# Patient Record
Sex: FEMALE | Race: WHITE | URBAN - METROPOLITAN AREA
[De-identification: names, ages, dates, MRNs, and addresses within clinical notes are randomized per-mention and may not be internally consistent; named-entity substitution may affect disease eponyms.]

---

## 2021-12-23 ENCOUNTER — HOSPITAL ENCOUNTER (OUTPATIENT)
Dept: ADMINISTRATIVE | Facility: HOSPITAL | Age: 21
End: 2021-12-23
Attending: OBSTETRICS & GYNECOLOGY | Admitting: OBSTETRICS & GYNECOLOGY

## 2021-12-23 LAB
ABS NEUT (OLG): 5.68 X10(3)/MCL (ref 2.1–9.2)
ALBUMIN SERPL-MCNC: 4.2 GM/DL (ref 3.5–5)
ALBUMIN/GLOB SERPL: 1.4 RATIO (ref 1.1–2)
ALP SERPL-CCNC: 51 UNIT/L (ref 40–150)
ALT SERPL-CCNC: 12 UNIT/L (ref 0–55)
ANION GAP SERPL CALC-SCNC: 19 MMOL/L
APPEARANCE, UA: ABNORMAL
AST SERPL-CCNC: 19 UNIT/L (ref 5–34)
BACTERIA SPEC CULT: ABNORMAL /HPF
BASOPHILS # BLD AUTO: 0 X10(3)/MCL (ref 0–0.2)
BASOPHILS NFR BLD AUTO: 0 %
BILIRUB SERPL-MCNC: 0.4 MG/DL
BILIRUB UR QL STRIP: NEGATIVE
BILIRUBIN DIRECT+TOT PNL SERPL-MCNC: 0.2 MG/DL (ref 0–0.5)
BILIRUBIN DIRECT+TOT PNL SERPL-MCNC: 0.2 MG/DL (ref 0–0.8)
BUN SERPL-MCNC: 8.9 MG/DL (ref 7–18.7)
BUN SERPL-MCNC: 9 MG/DL (ref 8–26)
CALCIUM SERPL-MCNC: 10.3 MG/DL (ref 8.7–10.5)
CHLORIDE SERPL-SCNC: 100 MMOL/L (ref 98–109)
CHLORIDE SERPL-SCNC: 106 MMOL/L (ref 98–107)
CO2 SERPL-SCNC: 23 MMOL/L (ref 22–29)
COLOR UR: YELLOW
CREAT SERPL-MCNC: 0.6 MG/DL (ref 0.6–1.3)
CREAT SERPL-MCNC: 0.73 MG/DL (ref 0.55–1.02)
EOSINOPHIL # BLD AUTO: 0.2 X10(3)/MCL (ref 0–0.9)
EOSINOPHIL NFR BLD AUTO: 2 %
ERYTHROCYTE [DISTWIDTH] IN BLOOD BY AUTOMATED COUNT: 11.8 % (ref 11.5–17)
EST CREAT CLEARANCE SER (OHS): 131.22 ML/MIN
GLOBULIN SER-MCNC: 3.1 GM/DL (ref 2.4–3.5)
GLUCOSE (UA): NEGATIVE
GLUCOSE SERPL-MCNC: 101 MG/DL (ref 70–105)
GLUCOSE SERPL-MCNC: 98 MG/DL (ref 74–100)
HCT VFR BLD AUTO: 42.3 % (ref 37–47)
HCT VFR BLD CALC: 45 % (ref 38–51)
HGB BLD-MCNC: 14.2 GM/DL (ref 12–16)
HGB BLD-MCNC: 15.3 MG/DL (ref 12–17)
HGB UR QL STRIP: NEGATIVE
KETONES UR QL STRIP: NEGATIVE
LEUKOCYTE ESTERASE UR QL STRIP: ABNORMAL
LYMPHOCYTES # BLD AUTO: 3.7 X10(3)/MCL (ref 0.6–4.6)
LYMPHOCYTES NFR BLD AUTO: 35 %
MCH RBC QN AUTO: 32.3 PG (ref 27–31)
MCHC RBC AUTO-ENTMCNC: 33.6 GM/DL (ref 33–36)
MCV RBC AUTO: 96.1 FL (ref 80–94)
MONOCYTES # BLD AUTO: 1 X10(3)/MCL (ref 0.1–1.3)
MONOCYTES NFR BLD AUTO: 10 %
NEUTROPHILS # BLD AUTO: 5.68 X10(3)/MCL (ref 2.1–9.2)
NEUTROPHILS NFR BLD AUTO: 53 %
NITRITE UR QL STRIP: NEGATIVE
PH UR STRIP: 7.5 [PH] (ref 5–9)
PLATELET # BLD AUTO: 342 X10(3)/MCL (ref 130–400)
PMV BLD AUTO: 9.3 FL (ref 9.4–12.4)
POC BETA-HCG (QUAL): NEGATIVE
POC IONIZED CALCIUM: 1.28 MMOL/L (ref 1.12–1.32)
POC TCO2: 26 MMOL/L (ref 24–29)
POTASSIUM BLD-SCNC: 3.8 MMOL/L (ref 3.5–4.9)
POTASSIUM SERPL-SCNC: 4.2 MMOL/L (ref 3.5–5.1)
PROT SERPL-MCNC: 7.3 GM/DL (ref 6.4–8.3)
PROT UR QL STRIP: NEGATIVE
RBC # BLD AUTO: 4.4 X10(6)/MCL (ref 4.2–5.4)
RBC #/AREA URNS HPF: ABNORMAL /[HPF]
SARS-COV-2 AG RESP QL IA.RAPID: NEGATIVE
SODIUM BLD-SCNC: 140 MMOL/L (ref 138–146)
SODIUM SERPL-SCNC: 139 MMOL/L (ref 136–145)
SP GR UR STRIP: 1.02 (ref 1–1.03)
SQUAMOUS EPITHELIAL, UA: 9 /HPF (ref 0–4)
UROBILINOGEN UR STRIP-ACNC: 0.2
WBC # SPEC AUTO: 10.6 X10(3)/MCL (ref 4.5–11.5)
WBC #/AREA URNS AUTO: 6 /HPF (ref 0–3)
WBC #/AREA URNS HPF: 6 /HPF (ref 0–3)

## 2022-04-30 NOTE — OP NOTE
DATE OF SURGERY:        SURGEON:  Christopher Stevens MD  ASSISTANT:  Tank Yañez CST    PREOPERATIVE DIAGNOSES:    1. Severe pelvic and abdominal pain.  2. Large pelvic mass.  3. Probable ovarian torsion.    POSTOPERATIVE DIAGNOSES:    1. Severe pelvic and abdominal pain.  2. Right ovarian cyst.  3. Torsion of the right ovary and tube.    PROCEDURE PERFORMED:  Operative laparoscopy, right salpingo oophorectomy, decompression of right ovarian cyst, and removal of right ovary and tube laparoscopically.    ESTIMATED BLOOD LOSS:  Less than 10 cc.    REPLACEMENT:  None.    COUNTS:  Lap and needle count were correct.    CATHETER:  She had a Han catheter.    PROCEDURE IN DETAIL:  The patient was brought to the operating room and placed on the operating table, after a discussion of the CT finding and severe abdominal pain.  Consents were obtained for the procedure.  She was brought to the operating room and placed on the operating table, where she underwent a general anesthetic induction.  The patient was placed in the frog-leg position.  The abdomen and perineum were prepped and draped.  A Han catheter was placed into the bladder.  An Ethicon Endopath intrauterine manipulator was placed into the uterus.  After placing the uterine manipulator, the patient was then laid in the supine position.  An intraumbilical skin incision was then made.  A Veress needle was inserted into the abdomen, and 2 liters of CO2 were insufflated into the abdominal cavity.  The Veress needle was removed, and a 10 mm trocar was placed through the umbilicus under direct visualization with the laparoscope.  Second and third punctures were made in the right and left lower abdominal quadrants with 5 mm trocars, followed by a fourth puncture just below the lower abdominal trocar site on the left side.  The abdomen and pelvis were then inspected.  She was noted to have a very large cyst and enlargement of the right ovary extending from the  right infundibulopelvic ligament across the midline of the abdomen, occupying the left side of the abdomen as well.  This mass was located just superior to the uterus.  The uterus was exposed using the manipulator.  The uterus was noted to be normal in size and shape with no lesions, fibroids, or any abnormality seen on the uterus.  The left adnexa was inspected.  The left ovary and tube appeared normal, with no evidence of disease.  Photographs were taken to identify the findings.  We then were able to identify the right fallopian tube.  It was encased secondary to the torsion of the adnexa.  The torsion was identified at the infundibulopelvic ligament at the right pelvic sidewall.  Using the Harmonic Scalpel, we were able to expose the right gonadal artery and vein at the infundibulopelvic ligament.  We used bipolar cautery to cauterize the pedicle, and then used the Harmonic Scalpel to incise the pedicle.  We did this in 3 stages and was able to successfully separate the right adnexa from the right gonadal artery and vein.  After freeing the specimen, we then opened the cyst wall and used the suction  to evacuate approximately 600 cc of fluid from the ovarian cyst.  After decompressing the ovary and tube to a more manageable size, we placed the specimen into an EndoCatch bag.  We extended the umbilical incision and retrieved the specimen through the umbilical incision.  Following this, the pelvis was irrigated copiously until clear.  The right gonadal artery and vein were noted to be hemostatic.  The rest of the pelvis was inspected and appeared clear, with no lesions identified.  We then placed powdered Surgicel over the area of dissection on the right side.  At this point, the instruments were all retrieved from the abdomen.  The trocars were then removed after the excess CO2 was expressed through the trocars.  The umbilical incision was closed using 2-0 Vicryl in interrupted fashion to close the  fascia.  The abdominal incision was then closed with 4-0 Monocryl and subcuticular stitches.  After completing the procedure, the uterine manipulator was removed from the uterus.  The Han catheter was removed from the bladder.  The patient awoke from the anesthetic and was transferred to the recovery room in stable condition.        ______________________________  Christopher Stevens MD DRB/AKILA  DD:  12/28/2021  Time:  12:54PM  DT:  12/28/2021  Time:  01:36PM  Job #:  [id number]

## 2022-04-30 NOTE — H&P
Patient:   Florida Johnson             MRN: [id number]            FIN: [id number]               Age:   21 years     Sex:  Female     :  2000   Associated Diagnoses:   None   Author:   Christopher Stevens MD      Basic Information   Source of history:  Self.    Present at bedside:  Significant other.    Referral source:  Self.    History limitation:  None.       Chief Complaint   2021 3:10 CST      c/o RLQ abd pain that radiates to her back for 24 hrs. Pain worsened, woke her up out of her sleep 1 hr ago. also chills and nausea.        History of Present Illness   Pt presemts with 19/10 abdominal pain since 2am this morning. Presnted to the ER lewis evaluation. CT reveals large ovarian cyst with possible torsion.  LMP 14 days ago. She is not on any meds.        Review of Systems   this patient is having no fever. She has normal bowel movements. She has normal urinary symptoms.      Health Status   Allergies:    Allergic Reactions (Selected)  No Known Medication Allergies,    Allergies (1) Active Reaction  No Known Medication Allergies None Documented     Current medications:  (Selected)   Inpatient Medications  Ordered  Lactated Ringers Injection intravenous solution 1,000 mL: 1,000 mL, 1,000 mL, IV, 125 mL/hr, start date 21 7:00:00 CST, 1.77, m2,    Medications (1) Active  Scheduled: (0)  Continuous: (1)  lactated ringers 1,000 mL  1,000 mL, IV, 125 mL/hr  PRN: (0)     Problem list:    No qualifying data available        Histories   Past Medical History:    No active or resolved past medical history items have been selected or recorded.   Family History:    No family history items have been selected or recorded.   Procedure history:    No active procedure history items have been selected or recorded., she also has a history of an abnormal pattern the past she had cryosurgery in  of the cervix   Social History        Social & Psychosocial Habits    Tobacco  2021  Use: Never (less  than 100 in l    Patient Wants Consult For Cessation Counseling No    Abuse/Neglect  12/23/2021  SHX Any signs of abuse or neglect No  .        Physical Examination   Vital Signs   12/23/2021 6:42 CST      Peripheral Pulse Rate     63 bpm                             Respiratory Rate          20 br/min                             SpO2                      99 %                             Oxygen Therapy            Room air                             Systolic Blood Pressure   106 mmHg                             Diastolic Blood Pressure  71 mmHg                             Mean Arterial Pressure, Cuff              83 mmHg    12/23/2021 4:30 CST      Peripheral Pulse Rate     65 bpm                             Respiratory Rate          18 br/min                             SpO2                      100 %                             Oxygen Therapy            Room air                             Systolic Blood Pressure   123 mmHg                             Diastolic Blood Pressure  76 mmHg                             Mean Arterial Pressure, Cuff              92 mmHg    12/23/2021 3:20 CST      SpO2                      100 %                             Oxygen Therapy            Room air    12/23/2021 3:10 CST      Temperature Temporal Artery               36.1 DegC  LOW                             Peripheral Pulse Rate     77 bpm                             Respiratory Rate          24 br/min                             SpO2                      100 %                             Oxygen Therapy            Room air                             Systolic Blood Pressure   118 mmHg                             Diastolic Blood Pressure  77 mmHg        Vital Signs (last 24 hrs)_____  Last Charted___________  Heart Rate Peripheral   63 bpm  (DEC 23 06:42)  Resp Rate         20 br/min  (DEC 23 06:42)  SBP      106 mmHg  (DEC 23 06:42)  DBP      71 mmHg  (DEC 23 06:42)  SpO2      99 %  (DEC 23 06:42)  Weight      69.5 kg  (DEC 23  06:07)  Height      165.1 cm  (DEC 23 06:07)  BMI      25.5  (DEC 23 06:07)     Measurements from flowsheet : Measurements   12/23/2021 6:07 CST      Weight Dosing             69.5 kg                             Weight Measured           69.5 kg                             Weight Measured and Calculated in Lbs     153.22 lb                             Height/Length Dosing      165.10 cm                             Height/Length Measured    165.1 cm                             Body Mass Index Measured  25.5 kg/m2    12/23/2021 3:10 CST      Weight Dosing             69.5 kg                             Weight Measured and Calculated in Lbs     153.22 lb                             Weight Estimated          69.5 kg                             Height/Length Dosing      165.10 cm                             Height/Length Estimated   165.10 cm                             Body Mass Index Estimated 25.5 kg/m2     the patient's head eyes ears nose and throat physical exam within normal limits  heart showed regular rate and rhythm no murmurs or gallops  Lungs showed vesicular breath sounds bilaterally with no wheezes or rales heard  abdomen was soft nondistended bowel sounds are positive no masses palpated there is no tenderness  external genitalia were normal  Vagina clear  Cervix closed  Uterus = displaced to the left, normal size  Ovaries= enlarged rt adnexa with possible torsion.  rectum=no masses felt  Extremities= no edema pulses equal bilaterally no cyanosis defect      Health Maintenance      Health Maintenance     Pending (in the next year)        OverDue           Alcohol Misuse Screening due  01/02/21  and every 1  year(s)        Due            ADL Screening due  12/23/21  and every 1  year(s)           Tetanus Vaccine due  12/23/21  and every 10  year(s)        Due In Future            Obesity Screening not due until  01/01/22  and every 1  year(s)     Satisfied (in the past 1 year)        Satisfied             Blood Pressure Screening on  12/23/21.  Satisfied by Dominic Post           Body Mass Index Check on  12/23/21.  Satisfied by Dominic Post           Obesity Screening on  12/23/21.  Satisfied by Dominic Post          Review / Management   Results review:     Labs (Last four charted values)  WBC                  10.6 (DEC 23)   Hgb                  14.2 (DEC 23)   Hct                  42.3 (DEC 23)   Plt                  342 (DEC 23)   Na                   139 (DEC 23)   K                    4.2 (DEC 23)   CO2                  23 (DEC 23)   Cl                   106 (DEC 23)   Cr                   0.73 (DEC 23)   BUN                  8.9 (DEC 23)   Glucose Random       98 (DEC 23) .    Radiology results   Rad Results (ST)   Accession: [id number]  Order: US Pelvic Non-OB w Transvag if needed  Report Dt/Tm: 12/23/2021 06:12  Report:   EXAM: PELVIC ULTRASOUND     INDICATION: RLQ Pain     COMPARISON: CT abdomen and pelvis from the same day     TECHNIQUE: Patient was scanned in the supine position utilizing the  transabdominal technique. Spectral Doppler evaluation of both ovaries  was performed.     FINDINGS:     Uterus:  Anteverted measuring 10.1 x 3.9 x 4.9 cm.  Normal sonographic appearance.  Homogeneous endometrial stripe measuring 11 mm in thickness.     Right ovary:  Measures 4.1 x 2.7 x 4.9 cm.  Normal sonographic appearance.  Spectral Doppler evaluation demonstrates only venous waveforms. No  arterial waveform seen.     Left ovary:  Measures 3.2 x 1.7 x 1.8 cm.  Normal sonographic appearance.  Spectral Doppler evaluation demonstrates normal arterial and venous  waveforms.     There is a left-sided cystic structure measuring 13.9 x 4.8 x 4.6 cm.  Fluid is anechoic. Walls are thin. This contacts both the left and  right ovaries.     IMPRESSION:::     1.  Large left-sided cyst measuring up to 14 cm. Favor left ovarian  cyst although the cyst contacts both the left and right ovaries.  2.   Normal-appearing right ovary. No definite arterial flow. Venous  flow is preserved. Typically with ovarian torsion, arterial flow can  be preserved and venous flow is absent. Consider repeat pelvic  sonogram to evaluate the right ovary flow.     Findings discussed with Dr. George by Dr. Bailey via telephone at 6:23  AM on 12/23/2021.    Accession: [id number]  Order: CT Abdomen and Pelvis W Contrast  Report Dt/Tm: 12/23/2021 06:08  Report:      TECHNIQUE:  Axial images of the abdomen and pelvis were obtained  following intravenous contrast administration. Contrast information:  100 mL Isovue-370. Coronal and sagittal reconstructions were provided.  Radiation dose lowering technique, automated exposure control, was  utilized for this exam.     INDICATION:  Right lower quadrant pain     COMPARISON: Pelvic sonogram from the same day     FINDINGS:      Thorax:  Lungs:The visualized lung bases appear unremarkable.  Pleura:No effusions or thickening.  Heart:The heart size is within normal limits.  Abdomen:  Abdominal Wall:No abdominal wall pathology is seen.  Liver: Masslike area of low density in the liver adjacent to the  gallbladder fossa measures 2.0 x 3.2 cm. Background liver appears  normal. No other suspected liver lesion.  Biliary System:No intrahepatic or extrahepatic biliary duct dilatation  is seen.  Gallbladder:Unremarkable appearing gallbladder.  Pancreas:Unremarkable appearing pancreas.  Spleen:Unremarkable appearing spleen.  Adrenals:The adrenal glands appear unremarkable.  Kidneys:The kidneys appear unremarkable with no stones cysts masses or  hydronephrosis.  Aorta:Unremarkable.  IVC:Unremarkable.  Bowel:  Esophagus:The visualized distal esophagus appears unremarkable.  Stomach:The stomach appears unremarkable.  Duodenum:Unremarkable appearing duodenum.  Small Bowel:Nondistended.  Colon:Nondistended.  Appendix:The appendix appears unremarkable (series 2, images 54-56).  Peritoneum:No free air and no  ascites.     Pelvis:  Bladder:Unremarkable.  Female:  Uterus:Unremarkable. The uterus is displaced to the right.  Ovaries:There is a large leftward adnexal cyst which measures  approximately 7.2 x 9 x 1.6 cm and demonstrates thin internal  septations. No enhancing mural nodule is identified within the cystic  lesion. The left ovary is not separately identified. This is larger  than the typical physiologic cyst. There is a 2.2 cm peripherally  enhancing right ovarian cyst which may reflect an involuting follicle.  Elective ultrasound should be considered for additional  characterization.     Bony structures:  Dorsal Spine:The visualized dorsal spine appears unremarkable.        Impression:  1. There is a large leftward adnexal cyst which measures approximately  7.2 x 9 x 1.6 cm and demonstrates thin internal septations. No  enhancing mural nodule is identified within the cystic lesion. The  left ovary is not separately identified. This is larger than the  typical physiologic cyst. Elective ultrasound should be considered for  additional characterization.  2. Normal appendix  3. Suspected approximately 3 cm hepatic mass in the gallbladder fossa.  Recommend abdominal MRI with and without contrast for definitive  characterization.        [place] concurrence with addition.          Impression and Plan     Ovarian cyst, pelvic / abdominal pain    I discussed findings with the patient and recommend laparoscopy for possible cysectomy/ oophorectomy.    Negative

## 2022-04-30 NOTE — ED PROVIDER NOTES
Patient:   Florida Johnson             MRN: [id number]            FIN: [id number]               Age:   21 years     Sex:  Female     :  2000   Associated Diagnoses:   Ovarian cyst, right; Pelvic pain in female   Author:   Saumya George MD      Basic Information   Time seen: Date & time 2021 03:10:00, Immediately upon arrival.   History source: Patient.   Arrival mode: Private vehicle.   Additional information: Chief Complaint from Nursing Triage Note : Chief Complaint   2021 3:10 CST      Chief Complaint           c/o RLQ abd pain that radiates to her back for 24 hrs. Pain worsened, woke her up out of her sleep 1 hr ago. also chills and nausea.  .      History of Present Illness   21-year-old female presents the emergency department with progressively worsening lower abdominal pain, now lateralizing to the right lower quadrant over the last 26 hours.  Reports the pain actually woke her from sleep around 1:00 this morning.  Complaining of chills and nausea as well.  Reports last menstrual period ended 4 days ago denies any fever.  No previous abdominal surgery.   The patient presents with abdominal pain.  The onset was 1  days ago.  The course/duration of symptoms is worsening.  The character of symptoms is crampy.  The degree at onset was moderate.  The Location of pain at onset was diffuse, lower and abdominal.  The degree at present is severe.  The Location of pain at present is right, lower and abdominal.  Radiating pain: back. There are exacerbating factors including changing position and movement.  The relieving factor is none.  Therapy today: none.  Risk factors consist of none.  Associated symptoms: nausea, denies shortness of breath and denies fever.        Review of Systems   Constitutional symptoms:  Chills, No fever,    Skin symptoms:  No rash,    Respiratory symptoms:  No shortness of breath,    Cardiovascular symptoms:  No chest pain,    Gastrointestinal symptoms:   Abdominal pain, nausea, no vomiting, no diarrhea.    Genitourinary symptoms:  No dysuria, no hematuria, no vaginal bleeding, no vaginal discharge.              Additional review of systems information: All other systems reviewed and otherwise negative.      Health Status   Allergies:    Allergic Reactions (Selected)  No Known Medication Allergies.      Past Medical/ Family/ Social History   Medical history: Negative.   Surgical history:    No active procedure history items have been selected or recorded..   Family history:    No family history items have been selected or recorded..   Social history:    Social & Psychosocial Habits    Tobacco  12/23/2021  Use: Never (less than 100 in l    Patient Wants Consult For Cessation Counseling No    Abuse/Neglect  12/23/2021  SHX Any signs of abuse or neglect No  , Reviewed as documented in chart.   Problem list:    No qualifying data available  .      Physical Examination               Vital Signs   Vital Signs   12/23/2021 3:10 CST      Temperature Temporal Artery               36.1 DegC  LOW                             Peripheral Pulse Rate     77 bpm                             Respiratory Rate          24 br/min                             SpO2                      100 %                             Oxygen Therapy            Room air                             Systolic Blood Pressure   118 mmHg                             Diastolic Blood Pressure  77 mmHg  .      Vital Signs (last 24 hrs)_____  Last Charted___________  Heart Rate Peripheral   77 bpm  (DEC 23 03:10)  Resp Rate         24 br/min  (DEC 23 03:10)  SBP      118 mmHg  (DEC 23 03:10)  DBP      77 mmHg  (DEC 23 03:10)  SpO2      100 %  (DEC 23 03:10)  .   Measurements   12/23/2021 3:10 CST      Weight Dosing             69.5 kg                             Weight Measured and Calculated in Lbs     153.22 lb                             Weight Estimated          69.5 kg                             Height/Length  Dosing      165.10 cm                             Height/Length Estimated   165.10 cm                             Body Mass Index Estimated 25.5 kg/m2  .   Basic Oxygen Information   2021 3:10 CST      SpO2                      100 %                             Oxygen Therapy            Room air  .   General:  Alert, moderate distress (due to pain).    Skin:  Warm, dry.    Head:  Normocephalic, atraumatic.    Neck:  Supple, trachea midline.    Eye:  Normal conjunctiva.   Ears, nose, mouth and throat:  Oral mucosa moist.   Cardiovascular:  Regular rate and rhythm, Normal peripheral perfusion, No edema.    Respiratory:  Lungs are clear to auscultation, respirations are non-labored.    Gastrointestinal:  Soft, Non distended, Normal bowel sounds, Tenderness: Moderate, suprapubic, right lower quadrant, left lower quadrant, Guarding: Negative, Rebound: Negative.    Neurological:  Alert and oriented to person, place, time, and situation.   Psychiatric:  Cooperative.      Medical Decision Making   Rationale:  Ms. Johnson presented w/ acute dominant/pelvic pain over the last 24 hours, progressively worsening and now lateralizing to the right.  Tender to palpation but without peritoneal signs.  Still with severe pain despite parenteral analgesics.  Laboratory studies unremarkable.  CT scan obtained with concern for possible appendicitis, demonstrates large adnexal cyst shifting concern for possible ovarian torsion. US w/ concern for lack of arterial flow to right ovary. Case reviewed [facility] Dr. Stevens who has a prior relationship with the patient. He evaluated the patient and recommended going to OR for laparoscopy for diagnosis, possible detorsion/cystectomy/oophorectomy. Findings and plan discussed with the patient, and she is agreeable to admission at this time.   .   Documents reviewed:  Emergency department nurses' notes.   Orders  Launch Order Profile (Selected)   Inpatient Orders  Ordered  HT Screenin21  3:07:23 CST  NPO: 12/23/21 3:17:00 CST, CM NPO  Ordered (Collected)  Urine Culture [id number]: Stat collect, 12/23/21 3:23:00 CST, Urine, Collected, Nurse collect, [id number], Stop date 12/23/21 3:23:00 CST  Ordered (Exam Completed)  CT Abdomen and Pelvis W Contrast: Stat, 12/23/21 3:16:00 CST, Abdominal Pain, RLQ pain x 26 hours, progressively worsening, now severe w/ associated nausea, None, Stretcher, Creatinine if needed per protocol, Rad Type, 12/23/21 3:16:00 CST  Ordered (Exam Ordered)  US Pelvic Non-OB w Transvag if needed: Stat, 12/23/21 4:31:00 CST, RLQ Pain, large adnexal lesion, severe pain, concern for ovarian torsion, None, Stretcher, Rad Type, Schedule this test, 12/23/21 4:31:00 CST  Ordered (In-Lab)  POC ISTAT Chem8 Request:: Blood, Stat collect, 12/23/21 3:16:00 CST by Saumya George MD, Stop date 12/23/21 3:16:00 CST, Lab Collect, Print Label By Order Location  Completed  Automated Diff: Stat collect, 12/23/21 3:24:00 CST, Blood, Collected, Once, Stop date 12/23/21 3:24:00 CST, Lab Collect, Print Label By Order Location, 12/23/21 3:16:00 CST  CBC w/ Auto Diff: Stat collect, 12/23/21 3:16:00 CST, Blood, Once, Stop date 12/23/21 3:16:00 CST, Lab Collect, Print Label By Order Location, 12/23/21 3:16:00 CST  CMP: Stat collect, 12/23/21 3:16:00 CST, Blood, Once, Stop date 12/23/21 3:16:00 CST, Lab Collect, Print Label By Order Location, 12/23/21 3:16:00 CST  Estimated Glomerular Filtration Rate: Stat collect, 12/23/21 3:24:00 CST, Blood, Collected, Once, Stop date 12/23/21 3:24:00 CST, Lab Collect, Print Label By Order Location, 12/23/21 3:16:00 CST  POC UPT ED: Urine, Stat collect, Collected, 12/23/21 3:16:00 CST by Saumya George MD, Stop date 12/23/21 3:16:00 CST, Nurse collect, Print Label By Order Location  Point of Care iSTAT Chem8: Blood, Stat collect, Collected, 12/23/21 3:37:07 CST  Toradol 30 mg for IV Push: 30 mg, form: Injection, IV Push, Once, first dose 12/23/21 3:17:00 CST, stop date  12/23/21 3:17:00 CST, STAT  UA with Reflex: Stat collect, Urine, 12/23/21 3:16:00 CST, Stop date 12/23/21 3:16:00 CST, Nurse collect  Zofran 2 mg/mL injectable solution: 4 mg, form: Injection, IV Push, Once, first dose 12/23/21 3:17:00 CST, stop date 12/23/21 3:17:00 CST, STAT  iopamidol: form: Soln, IV, AdHoc, first dose 12/23/21 4:00:54 CST, stop date 12/23/21 4:00:54 CST  morphine 4 mg/mL preservative-free intravenous solution: 4 mg, form: Injection, IV Push, Once, first dose 12/23/21 3:16:00 CST, stop date 12/23/21 3:16:00 CST, STAT  promethazine: 25 mg, form: Injection, IM, AdHoc, first dose 12/23/21 3:58:00 CST, stop date 12/23/21 3:58:00 CST.   Results review:  Lab results : Lab View   12/23/2021 6:58 CST      COVID-19 Rapid            NEGATIVE    12/23/2021 3:40 CST      Est Creat Clearance Ser   131.22 mL/min    12/23/2021 3:37 CST      POC Sodium                140 mmol/L                             POC Potassium             3.8 mmol/L                             POC Chloride              100 mmol/L                             POC Ion Calcium           1.28 mmol/L                             POC Glucose               101 mg/dL                             POC BUN                   9.0 mg/dL                             POC Creatinine            0.6 mg/dL                             POC AGAP                  19.0  NA                             POC Hb                    15.3 mg/dL                             POC Hct                   45.0 %                             POC TCO2                  26.0 mmol/L    12/23/2021 3:30 CST      U beta hCG Ql POC         Negative    12/23/2021 3:24 CST      Sodium Lvl                139 mmol/L                             Potassium Lvl             4.2 mmol/L                             Chloride                  106 mmol/L                             CO2                       23 mmol/L                             Calcium Lvl               10.3 mg/dL                              Glucose Lvl               98 mg/dL                             BUN                       8.9 mg/dL                             Creatinine                0.73 mg/dL                             eGFR-AA                   >60  NA                             eGFR-SATURNINO                  >60 mL/min/1.73 m2  NA                             Bili Total                0.4 mg/dL                             Bili Direct               0.2 mg/dL                             Bili Indirect             0.20 mg/dL                             AST                       19 unit/L                             ALT                       12 unit/L                             Alk Phos                  51 unit/L                             Total Protein             7.3 gm/dL                             Albumin Lvl               4.2 gm/dL                             Globulin                  3.1 gm/dL                             A/G Ratio                 1.4 ratio                             WBC                       10.6 x10(3)/mcL                             RBC                       4.40 x10(6)/mcL                             Hgb                       14.2 gm/dL                             Hct                       42.3 %                             Platelet                  342 x10(3)/mcL                             MCV                       96.1 fL  HI                             MCH                       32.3 pg  HI                             MCHC                      33.6 gm/dL                             RDW                       11.8 %                             MPV                       9.3 fL  LOW                             Abs Neut                  5.68 x10(3)/mcL                             Neutro Auto               53 %  NA                             Lymph Auto                35 %  NA                             Mono Auto                 10 %  NA                             Eos Auto                  2 %  NA                              Abs Eos                   0.2 x10(3)/mcL                             Basophil Auto             0 %  NA                             Abs Neutro                5.68 x10(3)/mcL                             Abs Lymph                 3.7 x10(3)/mcL                             Abs Mono                  1.0 x10(3)/mcL                             Abs Baso                  0.0 x10(3)/mcL    12/23/2021 3:23 CST      UA Appear                 TURBID                             UA Color                  YELLOW                             UA Spec Grav              1.019                             UA Bili                   Negative                             UA pH                     7.5                             UA Urobilinogen           0.2                             UA Blood                  Negative                             UA Glucose                Negative                             UA Ketones                Negative                             UA Protein                Negative                             UA Nitrite                Negative                             UA Leuk Est               Trace                             UA WBC                    6 /HPF  HI                             UA WBC cnt                6 /HPF  HI                             UA RBC                    NONE SEEN                             UA Bacteria               2+ /HPF                             UA Squam Epithelial       9 /HPF  HI  ,    No qualifying data available, Interpretation Abnormal results  mildly contaminated UA.    Radiology results:  Interpretation:  Preliminary Radiologic Findings  Technique: CT of the abdomen and pelvis was performed with axial images as well as sagittal and coronal  reconstruction images with intravenous contrast.  Comparison: None available.  Clinical History: RLQ pain progressively getting worse over last 24 hours.  Dosage Information: Automated Exposure Control was utilized 290.23  mGy.cm.  Findings:  Thorax:  Lungs: The visualized lung bases appear unremarkable.  Pleura: No effusions or thickening.  Heart: The heart size is within normal limits.  Abdomen:  Abdominal Wall: No abdominal wall pathology is seen.  Liver: Unremarkable appearing liver.  Biliary System: No intrahepatic or extrahepatic biliary duct dilatation is seen.  Gallbladder: Unremarkable appearing gallbladder.  Pancreas: Unremarkable appearing pancreas.  Spleen: Unremarkable appearing spleen.  Adrenals: The adrenal glands appear unremarkable.  Kidneys: The kidneys appear unremarkable with no stones cysts masses or hydronephrosis.  Aorta: Unremarkable.  IVC: Unremarkable.  Bowel:  Esophagus: The visualized distal esophagus appears unremarkable.  Stomach: The stomach appears unremarkable.  Duodenum: Unremarkable appearing duodenum.  Small Bowel: Nondistended.  Colon: Nondistended.  Appendix: The appendix appears unremarkable (series 2, images 54-56).  Peritoneum: No free air and no ascites.  Pelvis:  Bladder: Unremarkable.  Female:  Status: Final. Date Submitted: CDT Date Finalized: 2021-12-23 04:26:21 CDT  Patient Name : NATANAEL DOZIER Modality : CT\SR  Patient ID : [id number] Body Part :  Date of Birth : 2000 Sex : F Age : 21Y4M Study Date : 2021-12-23  Referring Physician : CAMI DOZIER MD Study Time : 03:53:32  Study Description : ABD PEL WITH(ADULT)  Uterus: Unremarkable. The uterus is displaced to the right.  Ovaries: There is a large leftward adnexal cyst which measures approximately 7.2 x 9 x 1.6 cm and  demonstrates thin internal septations. No enhancing mural nodule is identified within the cystic lesion. The  left ovary is not separately identified. This is larger than the typical physiologic cyst. There is a 2.2 cm  peripherally enhancing right ovarian cyst which may reflect an involuting follicle. Elective ultrasound should  be considered for additional characterization.  Bony structures:  Dorsal Spine: The  visualized dorsal spine appears unremarkable.  Impression:  1. There is a large leftward adnexal cyst which measures approximately 7.2 x 9 x 1.6 cm and  demonstrates thin internal septations. No enhancing mural nodule is identified within the cystic lesion. The  left ovary is not separately identified. This is larger than the typical physiologic cyst. Elective ultrasound  should be considered for additional characterization.  2. No CT features of appendicitis.  3. Details as above., Rad Results (ST)  < 12 hrs   Accession: [id number]  Order: US Pelvic Non-OB w Transvag if needed  Report Dt/Tm: 12/23/2021 06:12  Report:   EXAM: PELVIC ULTRASOUND     INDICATION: RLQ Pain     COMPARISON: CT abdomen and pelvis from the same day     TECHNIQUE: Patient was scanned in the supine position utilizing the  transabdominal technique. Spectral Doppler evaluation of both ovaries  was performed.     FINDINGS:     Uterus:  Anteverted measuring 10.1 x 3.9 x 4.9 cm.  Normal sonographic appearance.  Homogeneous endometrial stripe measuring 11 mm in thickness.     Right ovary:  Measures 4.1 x 2.7 x 4.9 cm.  Normal sonographic appearance.  Spectral Doppler evaluation demonstrates only venous waveforms. No  arterial waveform seen.     Left ovary:  Measures 3.2 x 1.7 x 1.8 cm.  Normal sonographic appearance.  Spectral Doppler evaluation demonstrates normal arterial and venous  waveforms.     There is a left-sided cystic structure measuring 13.9 x 4.8 x 4.6 cm.  Fluid is anechoic. Walls are thin. This contacts both the left and  right ovaries.     IMPRESSION:::     1.  Large left-sided cyst measuring up to 14 cm. Favor left ovarian  cyst although the cyst contacts both the left and right ovaries.  2.  Normal-appearing right ovary. No definite arterial flow. Venous  flow is preserved. Typically with ovarian torsion, arterial flow can  be preserved and venous flow is absent. Consider repeat pelvic  sonogram to evaluate the right ovary  flow.     Findings discussed with Dr. George by Dr. Bailey via telephone at 6:23  AM on 12/23/2021.    Accession: [id number]  Order: CT Abdomen and Pelvis W Contrast  Report Dt/Tm: 12/23/2021 06:08  Report:      TECHNIQUE:  Axial images of the abdomen and pelvis were obtained  following intravenous contrast administration. Contrast information:  100 mL Isovue-370. Coronal and sagittal reconstructions were provided.  Radiation dose lowering technique, automated exposure control, was  utilized for this exam.     INDICATION:  Right lower quadrant pain     COMPARISON: Pelvic sonogram from the same day     FINDINGS:      Thorax:  Lungs:The visualized lung bases appear unremarkable.  Pleura:No effusions or thickening.  Heart:The heart size is within normal limits.  Abdomen:  Abdominal Wall:No abdominal wall pathology is seen.  Liver: Masslike area of low density in the liver adjacent to the  gallbladder fossa measures 2.0 x 3.2 cm. Background liver appears  normal. No other suspected liver lesion.  Biliary System:No intrahepatic or extrahepatic biliary duct dilatation  is seen.  Gallbladder:Unremarkable appearing gallbladder.  Pancreas:Unremarkable appearing pancreas.  Spleen:Unremarkable appearing spleen.  Adrenals:The adrenal glands appear unremarkable.  Kidneys:The kidneys appear unremarkable with no stones cysts masses or  hydronephrosis.  Aorta:Unremarkable.  IVC:Unremarkable.  Bowel:  Esophagus:The visualized distal esophagus appears unremarkable.  Stomach:The stomach appears unremarkable.  Duodenum:Unremarkable appearing duodenum.  Small Bowel:Nondistended.  Colon:Nondistended.  Appendix:The appendix appears unremarkable (series 2, images 54-56).  Peritoneum:No free air and no ascites.     Pelvis:  Bladder:Unremarkable.  Female:  Uterus:Unremarkable. The uterus is displaced to the right.  Ovaries:There is a large leftward adnexal cyst which measures  approximately 7.2 x 9 x 1.6 cm and demonstrates thin  internal  septations. No enhancing mural nodule is identified within the cystic  lesion. The left ovary is not separately identified. This is larger  than the typical physiologic cyst. There is a 2.2 cm peripherally  enhancing right ovarian cyst which may reflect an involuting follicle.  Elective ultrasound should be considered for additional  characterization.     Bony structures:  Dorsal Spine:The visualized dorsal spine appears unremarkable.        Impression:  1. There is a large leftward adnexal cyst which measures approximately  7.2 x 9 x 1.6 cm and demonstrates thin internal septations. No  enhancing mural nodule is identified within the cystic lesion. The  left ovary is not separately identified. This is larger than the  typical physiologic cyst. Elective ultrasound should be considered for  additional characterization.  2. Normal appendix  3. Suspected approximately 3 cm hepatic mass in the gallbladder fossa.  Recommend abdominal MRI with and without contrast for definitive  characterization.        [place] concurrence with addition.    .       Reexamination/ Reevaluation   Vital signs   Basic Oxygen Information   12/23/2021 3:10 CST      SpO2                      100 %                             Oxygen Therapy            Room air        Impression and Plan   Diagnosis   Ovarian cyst, right ([id number] N83.201)   Pelvic pain in female ([id number] R10.2)      Calls-Consults   -  12/23/2021 05:35:00 , Edwin DEAL, Bruno MCKEON, GYN, will come to bedside for evaluation.    -  12/23/2021 06:06:00 , Hilda DEAL, Christopher ALLISON, GYN, called ED as prior established patient and patient's mother is an ongoing patient of Dr. Stevens's and had contacted him - evaluated at bedside and recommends exploratory laparoscopy..    Plan   Condition: Stable.    Disposition: Admit time  12/23/2021 07:00:00, Place in Observation Unit, Hilda DEAL, Christopher CORNEJO    Counseled: Patient, Regarding diagnosis, Regarding diagnostic results, Regarding  treatment plan, Patient indicated understanding of instructions.